# Patient Record
Sex: MALE | Race: WHITE | Employment: STUDENT | URBAN - METROPOLITAN AREA
[De-identification: names, ages, dates, MRNs, and addresses within clinical notes are randomized per-mention and may not be internally consistent; named-entity substitution may affect disease eponyms.]

---
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
## 2020-05-22 ENCOUNTER — LABORATORY ENCOUNTER (OUTPATIENT)
Dept: LAB | Age: 20
End: 2020-05-22
Attending: DERMATOLOGY
Payer: COMMERCIAL

## 2020-05-22 DIAGNOSIS — Z79.899 ENCOUNTER FOR LONG-TERM (CURRENT) USE OF OTHER MEDICATIONS: Primary | ICD-10-CM

## 2020-05-22 DIAGNOSIS — L70.0 ACNE VULGARIS: ICD-10-CM

## 2020-05-22 PROCEDURE — 36415 COLL VENOUS BLD VENIPUNCTURE: CPT

## 2020-05-22 PROCEDURE — 84478 ASSAY OF TRIGLYCERIDES: CPT

## 2020-05-22 PROCEDURE — 80053 COMPREHEN METABOLIC PANEL: CPT

## 2020-05-22 PROCEDURE — 85025 COMPLETE CBC W/AUTO DIFF WBC: CPT

## 2020-06-16 ENCOUNTER — OFFICE VISIT (OUTPATIENT)
Dept: FAMILY MEDICINE CLINIC | Facility: CLINIC | Age: 20
End: 2020-06-16
Payer: COMMERCIAL

## 2020-06-16 VITALS
OXYGEN SATURATION: 97 % | BODY MASS INDEX: 29.33 KG/M2 | HEIGHT: 69 IN | TEMPERATURE: 100 F | WEIGHT: 198 LBS | DIASTOLIC BLOOD PRESSURE: 80 MMHG | SYSTOLIC BLOOD PRESSURE: 110 MMHG | HEART RATE: 86 BPM

## 2020-06-16 DIAGNOSIS — Z23 NEED FOR HPV VACCINATION: ICD-10-CM

## 2020-06-16 DIAGNOSIS — S39.011A STRAIN OF RECTUS ABDOMINIS MUSCLE, INITIAL ENCOUNTER: Primary | ICD-10-CM

## 2020-06-16 PROCEDURE — 90471 IMMUNIZATION ADMIN: CPT | Performed by: FAMILY MEDICINE

## 2020-06-16 PROCEDURE — 99203 OFFICE O/P NEW LOW 30 MIN: CPT | Performed by: FAMILY MEDICINE

## 2020-06-16 PROCEDURE — 90651 9VHPV VACCINE 2/3 DOSE IM: CPT | Performed by: FAMILY MEDICINE

## 2020-06-16 RX ORDER — ISOTRETINOIN 40 MG/1
40 CAPSULE, LIQUID FILLED ORAL DAILY
COMMUNITY
Start: 2020-04-20 | End: 2020-06-16

## 2020-06-16 RX ORDER — ISOTRETINOIN 30 MG/1
30 CAPSULE, LIQUID FILLED ORAL 2 TIMES DAILY
COMMUNITY
Start: 2020-05-16 | End: 2021-11-05 | Stop reason: ALTCHOICE

## 2020-06-16 NOTE — PROGRESS NOTES
Lucien Swanson is a 23year old male. Patient presents with:  Hip Pain: inrm.  5      Chief Complaint Reviewed and Verified  Nursing Notes Reviewed and   Verified  Tobacco Reviewed  Allergies Reviewed  Medications Reviewed    Problem List Reviewed  Medic °C) (Temporal)   Ht 69\"   Wt 198 lb (89.8 kg)   SpO2 97%   BMI 29.24 kg/m²  Body mass index is 29.24 kg/m².       GENERAL: well developed, well nourished,in no apparent distress  SKIN: no rashes,no suspicious lesions  GI: good BS's,no masses, HSM or tender

## 2021-11-05 ENCOUNTER — OFFICE VISIT (OUTPATIENT)
Dept: FAMILY MEDICINE CLINIC | Facility: CLINIC | Age: 21
End: 2021-11-05
Payer: COMMERCIAL

## 2021-11-05 VITALS
BODY MASS INDEX: 27.11 KG/M2 | DIASTOLIC BLOOD PRESSURE: 68 MMHG | RESPIRATION RATE: 16 BRPM | SYSTOLIC BLOOD PRESSURE: 118 MMHG | OXYGEN SATURATION: 97 % | HEART RATE: 83 BPM | WEIGHT: 183 LBS | HEIGHT: 69 IN | TEMPERATURE: 97 F

## 2021-11-05 DIAGNOSIS — Z00.00 PREVENTATIVE HEALTH CARE: Primary | ICD-10-CM

## 2021-11-05 DIAGNOSIS — Z23 NEED FOR VACCINATION: ICD-10-CM

## 2021-11-05 PROCEDURE — 3008F BODY MASS INDEX DOCD: CPT | Performed by: FAMILY MEDICINE

## 2021-11-05 PROCEDURE — 3074F SYST BP LT 130 MM HG: CPT | Performed by: FAMILY MEDICINE

## 2021-11-05 PROCEDURE — 90471 IMMUNIZATION ADMIN: CPT | Performed by: FAMILY MEDICINE

## 2021-11-05 PROCEDURE — 99395 PREV VISIT EST AGE 18-39: CPT | Performed by: FAMILY MEDICINE

## 2021-11-05 PROCEDURE — 3078F DIAST BP <80 MM HG: CPT | Performed by: FAMILY MEDICINE

## 2021-11-05 PROCEDURE — 90715 TDAP VACCINE 7 YRS/> IM: CPT | Performed by: FAMILY MEDICINE

## 2021-11-05 NOTE — PATIENT INSTRUCTIONS
I reviewed prior labs, office notes and immunization history  Tdap booster given  Discussed age-appropriate anticipatory guidance. Discussed importance of sexual responsibility and routine use of condoms  Discussed avoidance of risk-taking behavior.   Disc

## 2021-11-05 NOTE — H&P
Alan Bates is a 24year old male who presents for a complete physical exam.   HPI:   Pt voices no concerns, \" I get a free physical w/ my insurance\"     Wt Readings from Last 6 Encounters:  11/05/21 : 183 lb (83 kg)  06/16/20 : 198 lb (89.8 kg)  09 pain,denies heartburn, constipation, diarrhea, or change in bowel habits  : denies dysuria, hesitancy,nocturia, decreased urine stream, incontinence, erectile dysfunction, decreased libido , +sexually active, +condoms, patient notes occasional bumps on h diagnosis)  Need for vaccination  Orders Placed This Encounter      TdaP (Boostrix/Adacel) Vaccine (> 7 Y)    Meds & Refills for this Visit:  Requested Prescriptions      No prescriptions requested or ordered in this encounter     Imaging & Consults:  Edu Persaud

## 2022-01-11 ENCOUNTER — TELEMEDICINE (OUTPATIENT)
Dept: FAMILY MEDICINE CLINIC | Facility: CLINIC | Age: 22
End: 2022-01-11

## 2022-01-11 VITALS — OXYGEN SATURATION: 97 % | HEART RATE: 110 BPM | TEMPERATURE: 98 F

## 2022-01-11 DIAGNOSIS — U07.1 COVID-19: Primary | ICD-10-CM

## 2022-01-11 DIAGNOSIS — R05.8 POST-VIRAL COUGH SYNDROME: ICD-10-CM

## 2022-01-11 DIAGNOSIS — R05.9 COUGH: ICD-10-CM

## 2022-01-11 DIAGNOSIS — J02.9 PHARYNGITIS, UNSPECIFIED ETIOLOGY: ICD-10-CM

## 2022-01-11 PROCEDURE — 99213 OFFICE O/P EST LOW 20 MIN: CPT | Performed by: FAMILY MEDICINE

## 2022-01-11 RX ORDER — PREDNISONE 20 MG/1
TABLET ORAL
Qty: 15 TABLET | Refills: 0 | Status: SHIPPED | OUTPATIENT
Start: 2022-01-11 | End: 2022-01-21

## 2022-01-11 RX ORDER — AZITHROMYCIN 250 MG/1
TABLET, FILM COATED ORAL
Qty: 6 TABLET | Refills: 0 | Status: SHIPPED | OUTPATIENT
Start: 2022-01-11 | End: 2022-01-16

## 2022-01-11 NOTE — PROGRESS NOTES
Virtual/Telephone Check-In    Susan Anthony  verbally consents to a Virtual/Telephone Check-In service on 1/11/2022 . Patient understands and accepts financial responsibility for any deductible, co-insurance and/or co-pays associated with this service. although he sees no coating over it this is the most painful part of the issue he is concerned he may have a superinfection with this and or pneumonia.     Patient is able to work from home so that this is not an issue but he does have some decreased endura during the call    ASSESSMENT AND PLAN:     Covid-19  (primary encounter diagnosis)  Cough  Post-viral cough syndrome  Pharyngitis, unspecified etiology    Problem List Items Addressed This Visit     None      Visit Diagnoses     COVID-19    -  Primary

## 2023-02-01 ENCOUNTER — OFFICE VISIT (OUTPATIENT)
Dept: FAMILY MEDICINE CLINIC | Facility: CLINIC | Age: 23
End: 2023-02-01
Payer: COMMERCIAL

## 2023-02-01 VITALS
HEIGHT: 70 IN | HEART RATE: 68 BPM | RESPIRATION RATE: 12 BRPM | TEMPERATURE: 98 F | WEIGHT: 197 LBS | SYSTOLIC BLOOD PRESSURE: 120 MMHG | BODY MASS INDEX: 28.2 KG/M2 | OXYGEN SATURATION: 98 % | DIASTOLIC BLOOD PRESSURE: 62 MMHG

## 2023-02-01 DIAGNOSIS — J01.00 ACUTE NON-RECURRENT MAXILLARY SINUSITIS: ICD-10-CM

## 2023-02-01 DIAGNOSIS — Z00.00 WELL ADULT EXAM: Primary | ICD-10-CM

## 2023-02-01 DIAGNOSIS — H10.33 ACUTE BACTERIAL CONJUNCTIVITIS OF BOTH EYES: ICD-10-CM

## 2023-02-01 PROCEDURE — 99395 PREV VISIT EST AGE 18-39: CPT | Performed by: FAMILY MEDICINE

## 2023-02-01 PROCEDURE — 3074F SYST BP LT 130 MM HG: CPT | Performed by: FAMILY MEDICINE

## 2023-02-01 PROCEDURE — 3008F BODY MASS INDEX DOCD: CPT | Performed by: FAMILY MEDICINE

## 2023-02-01 PROCEDURE — 3078F DIAST BP <80 MM HG: CPT | Performed by: FAMILY MEDICINE

## 2023-02-01 RX ORDER — AZITHROMYCIN 250 MG/1
TABLET, FILM COATED ORAL
Qty: 6 TABLET | Refills: 0 | Status: SHIPPED | OUTPATIENT
Start: 2023-02-01 | End: 2023-02-06

## 2023-02-01 RX ORDER — TOBRAMYCIN 3 MG/ML
1 SOLUTION/ DROPS OPHTHALMIC EVERY 4 HOURS
Qty: 5 ML | Refills: 0 | Status: SHIPPED | OUTPATIENT
Start: 2023-02-01 | End: 2023-02-06

## 2023-06-20 ENCOUNTER — HOSPITAL ENCOUNTER (OUTPATIENT)
Dept: GENERAL RADIOLOGY | Age: 23
Discharge: HOME | End: 2023-06-20
Payer: COMMERCIAL

## 2023-06-20 ENCOUNTER — OFFICE VISIT (OUTPATIENT)
Dept: FAMILY MEDICINE CLINIC | Facility: CLINIC | Age: 23
End: 2023-06-20
Payer: COMMERCIAL

## 2023-06-20 VITALS
HEIGHT: 70 IN | TEMPERATURE: 98 F | BODY MASS INDEX: 28 KG/M2 | OXYGEN SATURATION: 97 % | HEART RATE: 65 BPM | RESPIRATION RATE: 18 BRPM | SYSTOLIC BLOOD PRESSURE: 110 MMHG | DIASTOLIC BLOOD PRESSURE: 50 MMHG

## 2023-06-20 DIAGNOSIS — S99.911A INJURY OF RIGHT ANKLE, INITIAL ENCOUNTER: Primary | ICD-10-CM

## 2023-06-20 DIAGNOSIS — S99.911A INJURY OF RIGHT ANKLE, INITIAL ENCOUNTER: ICD-10-CM

## 2023-06-20 PROCEDURE — 3078F DIAST BP <80 MM HG: CPT

## 2023-06-20 PROCEDURE — 3008F BODY MASS INDEX DOCD: CPT

## 2023-06-20 PROCEDURE — 99213 OFFICE O/P EST LOW 20 MIN: CPT

## 2023-06-20 PROCEDURE — 3074F SYST BP LT 130 MM HG: CPT

## 2023-06-20 PROCEDURE — 73610 X-RAY EXAM OF ANKLE: CPT

## 2023-06-20 RX ORDER — MELOXICAM 15 MG/1
15 TABLET ORAL DAILY
Qty: 14 TABLET | Refills: 0 | Status: SHIPPED | OUTPATIENT
Start: 2023-06-20 | End: 2023-07-04

## 2023-06-21 ENCOUNTER — PATIENT MESSAGE (OUTPATIENT)
Dept: FAMILY MEDICINE CLINIC | Facility: CLINIC | Age: 23
End: 2023-06-21

## 2023-06-21 ENCOUNTER — TELEPHONE (OUTPATIENT)
Dept: FAMILY MEDICINE CLINIC | Facility: CLINIC | Age: 23
End: 2023-06-21

## 2023-06-21 DIAGNOSIS — T14.8XXA FRACTURE: Primary | ICD-10-CM

## 2023-06-21 DIAGNOSIS — S82.891A CLOSED FRACTURE OF RIGHT ANKLE, INITIAL ENCOUNTER: ICD-10-CM

## 2023-06-21 NOTE — TELEPHONE ENCOUNTER
From: Leana Saucedo  To: RAQUEL Blount  Sent: 6/21/2023 2:26 PM CDT  Subject: Question regarding XR ANKLE (MIN 3 VIEWS), RIGHT (CPT=73610)    Based on these results, will I have to be referred to an orthopedic?

## 2023-06-21 NOTE — TELEPHONE ENCOUNTER
I was going to place an order for him to see Nadine Dasilva. If he has an ortho he prefers that would be OK too.

## 2023-06-21 NOTE — TELEPHONE ENCOUNTER
This nurse spoke with Yumiko Dobson who states she did speak with patient about referral to Dr. Katelyn Bhandari, however patient states \"I don't know who that is\". Saira advised he speak with his parents about this and he wanted to discuss with Trinidad Tong tomorrow.

## 2023-06-21 NOTE — TELEPHONE ENCOUNTER
Called and spoke with patient. Informed of xray results. He is using crutches and has the ankle in an ace wrap. He is not able to come into the office this afternoon to try on an immobilizer boot. Instructed patient he should be non weight bearing until he follows up with ortho.   Patient is asking that Ibrahima Villasenor follow up with him in the morning regarding an ortho referral.

## 2023-06-22 ENCOUNTER — TELEPHONE (OUTPATIENT)
Dept: ORTHOPEDICS CLINIC | Facility: CLINIC | Age: 23
End: 2023-06-22

## 2023-06-22 ENCOUNTER — TELEPHONE (OUTPATIENT)
Dept: FAMILY MEDICINE CLINIC | Facility: CLINIC | Age: 23
End: 2023-06-22

## 2023-06-22 DIAGNOSIS — S99.911A INJURY OF RIGHT ANKLE, INITIAL ENCOUNTER: Primary | ICD-10-CM

## 2023-06-22 NOTE — TELEPHONE ENCOUNTER
Patient spoke to LUIS [facility], [address]. Please see other phone note for further details.

## 2023-06-22 NOTE — TELEPHONE ENCOUNTER
Virginie Moya., DPM  [facility] Clinical Pool 16 minutes ago (1:09 PM)     This looks non surgical   If can come to [place] tomorrow can see otherwise?    There is Sana Carlos and dr Solange Aguero too      If seen tomorrow, shouldn't need repeat xray

## 2023-06-22 NOTE — TELEPHONE ENCOUNTER
Patient called to request appt due to rt ankle fx, patient stated initial injury was 6/17, patient was seen in [address] [place] [address] on 6/20 and imaging in [facility] for review. Please advised if/when we are able to see this patient due to injury being almost a week ago.

## 2023-06-22 NOTE — TELEPHONE ENCOUNTER
Mom calling to see if pt can see ortho sooner. Advised mom that she is not on verbal so we can't give her any information.

## 2023-06-22 NOTE — TELEPHONE ENCOUNTER
Called patient and answered questions regarding follow up and seeing ortho. He feels pain is well controlled at this time and is continuing to use crutches and is following instructions to not bear weight on foot/ankle. Phone number and address for Aditi Castillo provided.

## 2023-06-22 NOTE — TELEPHONE ENCOUNTER
Patient has an upcoming appointment for rt ankle fx. Patient had diagnostic imaging on 6/20 and can be viewed in Epic. Please review imaging, and advise if further imaging is required. If, so please place RX accordingly.      Future Appointments   Date Time Provider Rony Rey   6/23/2023 12:45 PM Jeanette Triplett, DOMINIQUE [facility]

## 2023-06-22 NOTE — TELEPHONE ENCOUNTER
Pt was advised to call orthopedics at [facility]. They are unable to see him for 2 weeks. He wanted to where else he can go to get in asap.

## 2023-06-22 NOTE — TELEPHONE ENCOUNTER
Patient called back and Dr. Agnieszka Davies office is not going to be able to squeeze him in. They referred him to Dr. Ngozi Orozco. They are going to try and get him in tomorrow.  Referral placed per patient request.

## 2023-06-22 NOTE — TELEPHONE ENCOUNTER
Radha Fall MD     [facility] LOCATION:  [address] [facility], [address]  Ph [phone] Fx [phone]    Arun Orlando   [phone]    Spoke with patient who states he was given 4 different locations and dates. The soonest he could get in was with a provider on Monday in [facility]. This nurse gave the patient the contact information for Dr. Denisa Britt. He is going to call and see what that timeframe is and then will call back if referral needed.

## 2023-06-23 ENCOUNTER — OFFICE VISIT (OUTPATIENT)
Dept: ORTHOPEDICS CLINIC | Facility: CLINIC | Age: 23
End: 2023-06-23
Payer: COMMERCIAL

## 2023-06-23 VITALS — HEIGHT: 70 IN | WEIGHT: 197 LBS | BODY MASS INDEX: 28.2 KG/M2

## 2023-06-23 DIAGNOSIS — S82.64XA CLOSED NONDISPLACED FRACTURE OF LATERAL MALLEOLUS OF RIGHT FIBULA, INITIAL ENCOUNTER: Primary | ICD-10-CM

## 2023-06-23 PROCEDURE — 99204 OFFICE O/P NEW MOD 45 MIN: CPT | Performed by: PODIATRIST

## 2023-06-23 PROCEDURE — 3008F BODY MASS INDEX DOCD: CPT | Performed by: PODIATRIST

## 2023-07-19 ENCOUNTER — HOSPITAL ENCOUNTER (OUTPATIENT)
Dept: GENERAL RADIOLOGY | Age: 23
Discharge: HOME | End: 2023-07-19
Attending: PODIATRIST
Payer: COMMERCIAL

## 2023-07-19 ENCOUNTER — OFFICE VISIT (OUTPATIENT)
Dept: ORTHOPEDICS CLINIC | Facility: CLINIC | Age: 23
End: 2023-07-19
Payer: COMMERCIAL

## 2023-07-19 VITALS — WEIGHT: 197 LBS | BODY MASS INDEX: 28.2 KG/M2 | HEIGHT: 70 IN

## 2023-07-19 DIAGNOSIS — S82.64XA CLOSED NONDISPLACED FRACTURE OF LATERAL MALLEOLUS OF RIGHT FIBULA, INITIAL ENCOUNTER: ICD-10-CM

## 2023-07-19 DIAGNOSIS — S82.64XD CLOSED NONDISPLACED FRACTURE OF LATERAL MALLEOLUS OF RIGHT FIBULA WITH ROUTINE HEALING, SUBSEQUENT ENCOUNTER: Primary | ICD-10-CM

## 2023-07-19 PROCEDURE — 73610 X-RAY EXAM OF ANKLE: CPT | Performed by: PODIATRIST

## 2023-07-19 NOTE — PROGRESS NOTES
[facility] Progress Note    Subjective:     Palma Sandhoff is here for follow-up now 1 month post injury to his right ankle  He has been in the boot for this month and the first 2 weeks were nonweightbearing with crutches as instructed. He is having minimal to no pain with the exception of his forefoot area        Objective:     Mild tenderness about the metatarsals heads and extensor tendons of the forefoot no swelling but more tightness in mild discomfort. Very mild tenderness at the distal fibula but this is minimal.          Imaging: Xrays show a little gapping at the fracture site distal fibula but callus can be appreciated. Assessment/Plan:     Diagnoses and all orders for this visit:    Closed nondisplaced fracture of lateral malleolus of right fibula with routine healing, subsequent encounter  -     DME - EXTERNAL         He is progressing and I would recommend 2 more weeks in the high-profile boot weightbearing is allowed and then transition into either the Aircast or the Freddy brace he has with his tennis shoe. Follow-up is in 1 month for an x-ray of the ankle            Terra Martinez. Craig Rosas DPM  [facility]    Hey[employer]ubble speech recognition software was used to prepare this note. If a word or phrase is confusing, it is likely do to a failure of recognition. Please contact me with any questions or clarifications.

## 2023-08-30 ENCOUNTER — HOSPITAL ENCOUNTER (OUTPATIENT)
Dept: GENERAL RADIOLOGY | Age: 23
Discharge: HOME | End: 2023-08-30
Attending: PODIATRIST
Payer: COMMERCIAL

## 2023-08-30 ENCOUNTER — OFFICE VISIT (OUTPATIENT)
Dept: ORTHOPEDICS CLINIC | Facility: CLINIC | Age: 23
End: 2023-08-30
Payer: COMMERCIAL

## 2023-08-30 VITALS — WEIGHT: 197 LBS | BODY MASS INDEX: 28.2 KG/M2 | HEIGHT: 70 IN

## 2023-08-30 DIAGNOSIS — S82.64XD CLOSED NONDISPLACED FRACTURE OF LATERAL MALLEOLUS OF RIGHT FIBULA WITH ROUTINE HEALING, SUBSEQUENT ENCOUNTER: ICD-10-CM

## 2023-08-30 DIAGNOSIS — S82.64XG CLOSED NONDISPLACED FRACTURE OF LATERAL MALLEOLUS OF RIGHT FIBULA WITH DELAYED HEALING, SUBSEQUENT ENCOUNTER: Primary | ICD-10-CM

## 2023-08-30 PROCEDURE — 73610 X-RAY EXAM OF ANKLE: CPT | Performed by: PODIATRIST

## 2023-08-30 PROCEDURE — 99213 OFFICE O/P EST LOW 20 MIN: CPT | Performed by: PODIATRIST

## 2023-08-30 PROCEDURE — 3008F BODY MASS INDEX DOCD: CPT | Performed by: PODIATRIST

## 2023-10-11 ENCOUNTER — HOSPITAL ENCOUNTER (OUTPATIENT)
Dept: GENERAL RADIOLOGY | Age: 23
Discharge: HOME | End: 2023-10-11
Attending: PODIATRIST
Payer: COMMERCIAL

## 2023-10-11 DIAGNOSIS — S82.64XG CLOSED NONDISPLACED FRACTURE OF LATERAL MALLEOLUS OF RIGHT FIBULA WITH DELAYED HEALING, SUBSEQUENT ENCOUNTER: ICD-10-CM

## 2023-10-11 PROCEDURE — 73610 X-RAY EXAM OF ANKLE: CPT | Performed by: PODIATRIST

## 2023-10-18 ENCOUNTER — TELEPHONE (OUTPATIENT)
Dept: ORTHOPEDICS CLINIC | Facility: CLINIC | Age: 23
End: 2023-10-18

## 2023-10-18 DIAGNOSIS — S82.64XG CLOSED NONDISPLACED FRACTURE OF LATERAL MALLEOLUS OF RIGHT FIBULA WITH DELAYED HEALING, SUBSEQUENT ENCOUNTER: Primary | ICD-10-CM

## 2023-10-18 NOTE — TELEPHONE ENCOUNTER
Pt order placed. Spoke with pt. Advised of recommendation to move from brace to sturdy tennis shoes and start PT. Pt stated he goes to gym and is very active, feels he can do his own strengthening exercises. Strongly encouraged pt to start PT, go through the eval and at least have discussion with them on his intent so they can educate him on the proper exercixes. Advised best scenario would be that he continue PT through a formal program first because if ankle does not health the best it can, will have issues with ankle stability for possibly life. Pt stated understanding, will call around to decide which PT location and will call us back so we can fax order should he decide not to go to [facility].         October 18, 2023  Santos Silva., DOMINIQUE   to [facility] Clinical Pool       10/18/23  1:52 PM  Yes he can get out of brace  Pls put in order for ankle rehab

## 2023-10-18 NOTE — TELEPHONE ENCOUNTER
Patient called back with PT information. I did not see and external order to send. Please advise.     BLU Harris   [facility], [address]    UA:[phone]  Fax: [phone]

## 2023-10-18 NOTE — TELEPHONE ENCOUNTER
Patient waiting for a call back on results   He would like to get out of brace and start PT.       XR 10/11/23  FINDINGS:  Redemonstrated is a fracture of the distal fibula. The fracture margin is less conspicuous on today's exam with interval development of bony bridging. There is no significant callus formation about the fracture site. Alignment is unchanged.

## 2023-11-01 ENCOUNTER — MED REC SCAN ONLY (OUTPATIENT)
Dept: ORTHOPEDICS CLINIC | Facility: CLINIC | Age: 23
End: 2023-11-01

## 2025-03-11 ENCOUNTER — OFFICE VISIT (OUTPATIENT)
Dept: FAMILY MEDICINE CLINIC | Facility: CLINIC | Age: 25
End: 2025-03-11
Payer: COMMERCIAL

## 2025-03-11 VITALS
DIASTOLIC BLOOD PRESSURE: 77 MMHG | SYSTOLIC BLOOD PRESSURE: 124 MMHG | HEART RATE: 68 BPM | TEMPERATURE: 98 F | OXYGEN SATURATION: 95 % | RESPIRATION RATE: 12 BRPM | WEIGHT: 213 LBS | BODY MASS INDEX: 31 KG/M2

## 2025-03-11 DIAGNOSIS — J40 SINOBRONCHITIS: Primary | ICD-10-CM

## 2025-03-11 DIAGNOSIS — J32.9 SINOBRONCHITIS: Primary | ICD-10-CM

## 2025-03-11 PROCEDURE — 99213 OFFICE O/P EST LOW 20 MIN: CPT | Performed by: FAMILY MEDICINE

## 2025-03-11 RX ORDER — PREDNISONE 20 MG/1
TABLET ORAL
Qty: 15 TABLET | Refills: 0 | Status: SHIPPED | OUTPATIENT
Start: 2025-03-11 | End: 2025-03-21

## 2025-03-11 NOTE — PROGRESS NOTES
The following individual(s) verbally consented to be recorded using ambient AI listening technology and understand that they can each withdraw their consent to this listening technology at any point by asking the clinician to turn off or pause the recording:    Patient name: Shahidbridgett Johnson  Additional names:

## 2025-03-11 NOTE — PROGRESS NOTES
Subjective:   Shahid Johnson is a 24 year old male who presents for Cough       History/Other:   History of Present Illness  He presents with cough and congestion.    He began experiencing symptoms eight days ago, initially with generalized body aches. Three days later, he felt almost back to normal, but then developed significant cough and congestion, which have persisted for the last four to five days.    The cough is productive, with yellow phlegm, and occurs in fits lasting up to thirty minutes, particularly in the morning and at night. The cough causes throat discomfort, although the initial sore throat has resolved.    He experienced a low-grade fever for one day, which has since resolved, but continues to feel hot and sweaty without a high temperature. He notes waking up drenched in sweat at night.    No ear pain or sinus pain, although he experiences alternating periods of nasal congestion and clear breathing. He reports both coughing up and blowing out mucus.    He is not currently taking any medications, except for ibuprofen, which he believes may have helped reduce his temperature. He confirms no known allergies and is working from home, thus not requiring a work note.   Chief Complaint Reviewed and Verified  No Further Nursing Notes to   Review  Tobacco Reviewed  Allergies Reviewed  Medications Reviewed    Problem List Reviewed  Medical History Reviewed  Surgical History   Reviewed  Family History Reviewed         Tobacco:  He has never smoked tobacco.    Current Outpatient Medications   Medication Sig Dispense Refill    amoxicillin clavulanate 875-125 MG Oral Tab Take 1 tablet by mouth 2 (two) times daily for 10 days. 20 tablet 0    predniSONE 20 MG Oral Tab 2 tabs daily 5 days 1 tab daily 5 days 15 tablet 0       PHQ-2 SCORE: 0  , done 3/11/2025          Review of Systems:  Pertinent items are noted in HPI.      Objective:   /77 (BP Location: Right arm, Patient Position: Sitting,  Cuff Size: large)   Pulse 68   Temp 98 °F (36.7 °C) (Temporal)   Resp 12   Wt 213 lb (96.6 kg)   SpO2 95%   BMI 30.56 kg/m²  Estimated body mass index is 30.56 kg/m² as calculated from the following:    Height as of 8/30/23: 5' 10\" (1.778 m).    Weight as of this encounter: 213 lb (96.6 kg).  Results  LABS  Temperature: 98°F (03/11/2025)     Physical Exam  VITALS: T- 98.0  HEENT: Ears normal bilaterally, postnasal drip present, no sinus tenderness  NECK: No cervical lymphadenopathy  CHEST: Clear to auscultation bilaterally, no wheeze  THROAT mild post nasal drainage no exudate   SKIN No pallor jaundice  No icterus  No diaphoresis  warm and dry     Assessment & Plan:   1. Sinobronchitis (Primary)  -     Amoxicillin-Pot Clavulanate; Take 1 tablet by mouth 2 (two) times daily for 10 days.  Dispense: 20 tablet; Refill: 0  -     predniSONE; 2 tabs daily 5 days 1 tab daily 5 days  Dispense: 15 tablet; Refill: 0    Assessment & Plan  sinobronchitis  Suspected viral infection followed by bacterial infection. Postnasal drip and clear lungs indicate bronchitis and sinusitis without pneumonia.  - Prescribed antibiotic for ten days.  - Prescribed steroid for ten days to reduce congestion and irritation.  - Emphasized completing full course of medications to prevent relapse.        No follow-ups on file.        Isaias Farris MD, 3/11/2025, 11:48 AM